# Patient Record
Sex: MALE | URBAN - METROPOLITAN AREA
[De-identification: names, ages, dates, MRNs, and addresses within clinical notes are randomized per-mention and may not be internally consistent; named-entity substitution may affect disease eponyms.]

---

## 2019-01-24 ENCOUNTER — OFFICE VISIT (OUTPATIENT)
Dept: URBAN - METROPOLITAN AREA CLINIC 33 | Facility: CLINIC | Age: 54
End: 2019-01-24
Payer: COMMERCIAL

## 2019-01-24 DIAGNOSIS — G24.5 BLEPHAROSPASM: ICD-10-CM

## 2019-01-24 DIAGNOSIS — H43.811 VITREOUS DEGENERATION, RIGHT EYE: ICD-10-CM

## 2019-01-24 DIAGNOSIS — H04.123 DRY EYE SYNDROME OF BILATERAL LACRIMAL GLANDS: Primary | ICD-10-CM

## 2019-01-24 DIAGNOSIS — H40.023 OPEN ANGLE WITH BORDERLINE FINDINGS, HIGH RISK, BILATERAL: ICD-10-CM

## 2019-01-24 DIAGNOSIS — H25.13 AGE-RELATED NUCLEAR CATARACT, BILATERAL: ICD-10-CM

## 2019-01-24 PROCEDURE — 99204 OFFICE O/P NEW MOD 45 MIN: CPT | Performed by: OPTOMETRIST

## 2019-01-24 ASSESSMENT — KERATOMETRY
OS: 44.88
OD: 45.25

## 2019-01-24 ASSESSMENT — INTRAOCULAR PRESSURE
OS: 22
OD: 21

## 2019-01-24 NOTE — IMPRESSION/PLAN
Impression: Open angle with borderline findings, high risk, bilateral: H40.023. Plan: POAG Suspect OU due to elevated IOP (21/22). Asymmetric nerve and fam HX (mother). No treatment at this time.  RTC for glaucoma eval.

## 2019-01-24 NOTE — IMPRESSION/PLAN
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 Plan: Visible twitch of RLL x 2 weeks constant.  RTC for oculoplastics eval.